# Patient Record
Sex: MALE | Race: WHITE | NOT HISPANIC OR LATINO | URBAN - METROPOLITAN AREA
[De-identification: names, ages, dates, MRNs, and addresses within clinical notes are randomized per-mention and may not be internally consistent; named-entity substitution may affect disease eponyms.]

---

## 2019-08-22 ENCOUNTER — APPOINTMENT (RX ONLY)
Dept: URBAN - METROPOLITAN AREA CLINIC 157 | Facility: CLINIC | Age: 34
Setting detail: DERMATOLOGY
End: 2019-08-22

## 2019-08-22 DIAGNOSIS — T07XXXA INSECT BITE, NONVENOMOUS, OF OTHER, MULTIPLE, AND UNSPECIFIED SITES, WITHOUT MENTION OF INFECTION: ICD-10-CM

## 2019-08-22 PROBLEM — T07.XXXA UNSPECIFIED MULTIPLE INJURIES, INITIAL ENCOUNTER: Status: ACTIVE | Noted: 2019-08-22

## 2019-08-22 PROCEDURE — ? RECOMMENDATIONS

## 2019-08-22 PROCEDURE — ? PRESCRIPTION

## 2019-08-22 PROCEDURE — ? TREATMENT REGIMEN

## 2019-08-22 PROCEDURE — ? COUNSELING

## 2019-08-22 PROCEDURE — 99202 OFFICE O/P NEW SF 15 MIN: CPT

## 2019-08-22 PROCEDURE — ? ADDITIONAL NOTES

## 2019-08-22 RX ORDER — DOXYCYCLINE HYCLATE 100 MG/1
CAPSULE, GELATIN COATED ORAL
Qty: 60 | Refills: 0 | Status: ERX | COMMUNITY
Start: 2019-08-22

## 2019-08-22 RX ADMIN — DOXYCYCLINE HYCLATE: 100 CAPSULE, GELATIN COATED ORAL at 13:32

## 2019-08-22 NOTE — PROCEDURE: ADDITIONAL NOTES
Additional Notes: - Concern for relapsing fevers\\n- Called consulting NP to discuss\\n- We discussed that the best course of action is to switch to doxycycline to cover many tick-borne illnesses and refer to ID for further evaluation of relapsing fevers following a tick bite
Detail Level: Simple

## 2019-08-22 NOTE — PROCEDURE: RECOMMENDATIONS
Detail Level: Zone
Recommendations (Free Text): Recommend referral to Infectious Disease
Recommendation Preamble: The following recommendations were made during the visit:

## 2019-08-22 NOTE — HPI: RASH (TICK BITE)
Is This A New Presentation, Or A Follow-Up?: Tick Bite
How Severe Is It?: moderate
Additional History: Patient notes he has had relapsing fevers and tonsil swelling intermittently over the course of 7 weeks following tick bite.